# Patient Record
Sex: MALE | Race: WHITE | NOT HISPANIC OR LATINO | URBAN - METROPOLITAN AREA
[De-identification: names, ages, dates, MRNs, and addresses within clinical notes are randomized per-mention and may not be internally consistent; named-entity substitution may affect disease eponyms.]

---

## 2022-11-21 ENCOUNTER — OFFICE VISIT (OUTPATIENT)
Dept: URGENT CARE | Facility: CLINIC | Age: 22
End: 2022-11-21
Payer: MEDICAID

## 2022-11-21 VITALS
RESPIRATION RATE: 18 BRPM | BODY MASS INDEX: 28.81 KG/M2 | WEIGHT: 244 LBS | HEART RATE: 111 BPM | DIASTOLIC BLOOD PRESSURE: 77 MMHG | OXYGEN SATURATION: 97 % | TEMPERATURE: 99 F | SYSTOLIC BLOOD PRESSURE: 133 MMHG | HEIGHT: 77 IN

## 2022-11-21 DIAGNOSIS — J06.9 UPPER RESPIRATORY TRACT INFECTION, UNSPECIFIED TYPE: ICD-10-CM

## 2022-11-21 DIAGNOSIS — R05.9 COUGH, UNSPECIFIED TYPE: Primary | ICD-10-CM

## 2022-11-21 DIAGNOSIS — H66.93 ACUTE OTITIS MEDIA, BILATERAL: ICD-10-CM

## 2022-11-21 LAB
CTP QC/QA: YES
FLUAV AG NPH QL: NEGATIVE
FLUBV AG NPH QL: NEGATIVE
S PYO RRNA THROAT QL PROBE: NEGATIVE
SARS-COV-2 AG RESP QL IA.RAPID: NEGATIVE

## 2022-11-21 PROCEDURE — 99204 OFFICE O/P NEW MOD 45 MIN: CPT | Mod: 25,S$GLB,, | Performed by: STUDENT IN AN ORGANIZED HEALTH CARE EDUCATION/TRAINING PROGRAM

## 2022-11-21 PROCEDURE — 87804 INFLUENZA ASSAY W/OPTIC: CPT | Mod: QW,,, | Performed by: STUDENT IN AN ORGANIZED HEALTH CARE EDUCATION/TRAINING PROGRAM

## 2022-11-21 PROCEDURE — 1159F MED LIST DOCD IN RCRD: CPT | Mod: CPTII,S$GLB,, | Performed by: STUDENT IN AN ORGANIZED HEALTH CARE EDUCATION/TRAINING PROGRAM

## 2022-11-21 PROCEDURE — 3078F DIAST BP <80 MM HG: CPT | Mod: CPTII,S$GLB,, | Performed by: STUDENT IN AN ORGANIZED HEALTH CARE EDUCATION/TRAINING PROGRAM

## 2022-11-21 PROCEDURE — 3075F PR MOST RECENT SYSTOLIC BLOOD PRESS GE 130-139MM HG: ICD-10-PCS | Mod: CPTII,S$GLB,, | Performed by: STUDENT IN AN ORGANIZED HEALTH CARE EDUCATION/TRAINING PROGRAM

## 2022-11-21 PROCEDURE — 87811 SARS-COV-2 COVID19 W/OPTIC: CPT | Mod: QW,S$GLB,, | Performed by: STUDENT IN AN ORGANIZED HEALTH CARE EDUCATION/TRAINING PROGRAM

## 2022-11-21 PROCEDURE — 87880 POCT RAPID STREP A: ICD-10-PCS | Mod: QW,,, | Performed by: STUDENT IN AN ORGANIZED HEALTH CARE EDUCATION/TRAINING PROGRAM

## 2022-11-21 PROCEDURE — 1160F PR REVIEW ALL MEDS BY PRESCRIBER/CLIN PHARMACIST DOCUMENTED: ICD-10-PCS | Mod: CPTII,S$GLB,, | Performed by: STUDENT IN AN ORGANIZED HEALTH CARE EDUCATION/TRAINING PROGRAM

## 2022-11-21 PROCEDURE — 96372 THER/PROPH/DIAG INJ SC/IM: CPT | Mod: S$GLB,,, | Performed by: STUDENT IN AN ORGANIZED HEALTH CARE EDUCATION/TRAINING PROGRAM

## 2022-11-21 PROCEDURE — 99204 PR OFFICE/OUTPT VISIT, NEW, LEVL IV, 45-59 MIN: ICD-10-PCS | Mod: 25,S$GLB,, | Performed by: STUDENT IN AN ORGANIZED HEALTH CARE EDUCATION/TRAINING PROGRAM

## 2022-11-21 PROCEDURE — 3078F PR MOST RECENT DIASTOLIC BLOOD PRESSURE < 80 MM HG: ICD-10-PCS | Mod: CPTII,S$GLB,, | Performed by: STUDENT IN AN ORGANIZED HEALTH CARE EDUCATION/TRAINING PROGRAM

## 2022-11-21 PROCEDURE — 1160F RVW MEDS BY RX/DR IN RCRD: CPT | Mod: CPTII,S$GLB,, | Performed by: STUDENT IN AN ORGANIZED HEALTH CARE EDUCATION/TRAINING PROGRAM

## 2022-11-21 PROCEDURE — 87811 SARS CORONAVIRUS 2 ANTIGEN POCT, MANUAL READ: ICD-10-PCS | Mod: QW,S$GLB,, | Performed by: STUDENT IN AN ORGANIZED HEALTH CARE EDUCATION/TRAINING PROGRAM

## 2022-11-21 PROCEDURE — 96372 PR INJECTION,THERAP/PROPH/DIAG2ST, IM OR SUBCUT: ICD-10-PCS | Mod: S$GLB,,, | Performed by: STUDENT IN AN ORGANIZED HEALTH CARE EDUCATION/TRAINING PROGRAM

## 2022-11-21 PROCEDURE — 3008F PR BODY MASS INDEX (BMI) DOCUMENTED: ICD-10-PCS | Mod: CPTII,S$GLB,, | Performed by: STUDENT IN AN ORGANIZED HEALTH CARE EDUCATION/TRAINING PROGRAM

## 2022-11-21 PROCEDURE — 87804 POCT INFLUENZA A/B: ICD-10-PCS | Mod: 59,QW,, | Performed by: STUDENT IN AN ORGANIZED HEALTH CARE EDUCATION/TRAINING PROGRAM

## 2022-11-21 PROCEDURE — 87880 STREP A ASSAY W/OPTIC: CPT | Mod: QW,,, | Performed by: STUDENT IN AN ORGANIZED HEALTH CARE EDUCATION/TRAINING PROGRAM

## 2022-11-21 PROCEDURE — 3008F BODY MASS INDEX DOCD: CPT | Mod: CPTII,S$GLB,, | Performed by: STUDENT IN AN ORGANIZED HEALTH CARE EDUCATION/TRAINING PROGRAM

## 2022-11-21 PROCEDURE — 3075F SYST BP GE 130 - 139MM HG: CPT | Mod: CPTII,S$GLB,, | Performed by: STUDENT IN AN ORGANIZED HEALTH CARE EDUCATION/TRAINING PROGRAM

## 2022-11-21 PROCEDURE — 1159F PR MEDICATION LIST DOCUMENTED IN MEDICAL RECORD: ICD-10-PCS | Mod: CPTII,S$GLB,, | Performed by: STUDENT IN AN ORGANIZED HEALTH CARE EDUCATION/TRAINING PROGRAM

## 2022-11-21 RX ORDER — METHYLPHENIDATE HYDROCHLORIDE EXTENDED RELEASE 10 MG/1
10 TABLET ORAL EVERY MORNING
COMMUNITY
Start: 2022-11-03 | End: 2022-12-03

## 2022-11-21 RX ORDER — DEXAMETHASONE SODIUM PHOSPHATE 4 MG/ML
8 INJECTION, SOLUTION INTRA-ARTICULAR; INTRALESIONAL; INTRAMUSCULAR; INTRAVENOUS; SOFT TISSUE
Status: COMPLETED | OUTPATIENT
Start: 2022-11-21 | End: 2022-11-21

## 2022-11-21 RX ORDER — AMOXICILLIN AND CLAVULANATE POTASSIUM 875; 125 MG/1; MG/1
1 TABLET, FILM COATED ORAL EVERY 12 HOURS
Qty: 20 TABLET | Refills: 0 | Status: SHIPPED | OUTPATIENT
Start: 2022-11-21 | End: 2022-12-01

## 2022-11-21 RX ORDER — CETIRIZINE HYDROCHLORIDE 10 MG/1
10 TABLET ORAL
COMMUNITY
End: 2024-01-05

## 2022-11-21 RX ORDER — GUAIFENESIN/DEXTROMETHORPHAN 100-10MG/5
5 SYRUP ORAL EVERY 4 HOURS PRN
Qty: 118 ML | Refills: 0 | Status: SHIPPED | OUTPATIENT
Start: 2022-11-21 | End: 2022-12-01

## 2022-11-21 RX ORDER — LEVETIRACETAM 1000 MG/1
1500 TABLET ORAL 2 TIMES DAILY
COMMUNITY
Start: 2022-02-17

## 2022-11-21 RX ADMIN — DEXAMETHASONE SODIUM PHOSPHATE 8 MG: 4 INJECTION, SOLUTION INTRA-ARTICULAR; INTRALESIONAL; INTRAMUSCULAR; INTRAVENOUS; SOFT TISSUE at 01:11

## 2022-11-21 NOTE — PROGRESS NOTES
"   Subjective:       Patient ID: Lukas Ovalle is a 22 y.o. male.    Vitals:  height is 6' 5" (1.956 m) and weight is 110.7 kg (244 lb). His temperature is 99.2 °F (37.3 °C). His blood pressure is 133/77 and his pulse is 111 (abnormal). His respiration is 18 and oxygen saturation is 97%.     Chief Complaint: Sore Throat    Patient is a 22-year-old male who presents to clinic for evaluation of sore throat.  Patient reports symptoms x4 days.  Patient reports positive sick exposure as his sister has strep throat.  Patient reports over-the-counter medications with no relief of symptoms.  Patient reports he is vaccinated for COVID however not influenza.  Patient states that he has experienced fatigue, bilateral ear pain, nasal sinus congestion with postnasal drainage, sore throat with painful swallowing, nonproductive cough, and headaches.  Patient denies any acute dizziness, generalized body aches, ear drainage, tinnitus or hearing loss, drooling or voice change, chest pain or palpitations, shortness of breath, abdominal pain, nausea or vomiting, diarrhea, rash, or change in mentation.    Sore Throat   This is a new problem. The current episode started in the past 7 days (x's 4 days). The problem has been gradually worsening. There has been no fever. Associated symptoms include congestion, coughing, ear pain (Bilateral), headaches and trouble swallowing. Pertinent negatives include no abdominal pain, diarrhea, drooling, ear discharge, shortness of breath or vomiting. Associated symptoms comments: Sore throat. Treatments tried: dayquil, nyquil. The treatment provided mild relief.     Constitution: Positive for fatigue. Negative for chills, sweating and fever.   HENT:  Positive for ear pain (Bilateral), congestion, postnasal drip, sore throat and trouble swallowing. Negative for ear discharge, tinnitus, hearing loss, drooling and voice change.    Neck: neck negative.   Cardiovascular: Negative.  Negative for chest pain " and palpitations.   Eyes: Negative.    Respiratory:  Positive for cough. Negative for chest tightness, sputum production and shortness of breath.    Gastrointestinal: Negative.  Negative for abdominal pain, nausea, vomiting and diarrhea.   Endocrine: negative.   Genitourinary: Negative.    Musculoskeletal: Negative.  Negative for muscle ache.   Skin: Negative.  Negative for color change, pale, rash and erythema.   Allergic/Immunologic: Negative.    Neurological:  Positive for headaches. Negative for dizziness, light-headedness, passing out, disorientation and altered mental status.   Hematologic/Lymphatic: Negative.    Psychiatric/Behavioral: Negative.  Negative for altered mental status, disorientation and confusion.      Objective:      Physical Exam   Constitutional: He is oriented to person, place, and time. He appears well-developed. He is cooperative.  Non-toxic appearance. He appears ill. No distress.   HENT:   Head: Normocephalic and atraumatic.   Ears:   Right Ear: Hearing, external ear and ear canal normal. Tympanic membrane is erythematous and bulging.   Left Ear: Hearing, external ear and ear canal normal. Tympanic membrane is erythematous and bulging.   Nose: Congestion present. No mucosal edema, rhinorrhea or nasal deformity. No epistaxis. Right sinus exhibits no maxillary sinus tenderness and no frontal sinus tenderness. Left sinus exhibits no maxillary sinus tenderness and no frontal sinus tenderness.   Mouth/Throat: Uvula is midline and mucous membranes are normal. Mucous membranes are moist. No trismus in the jaw. Normal dentition. No uvula swelling. Posterior oropharyngeal erythema and cobblestoning present. No oropharyngeal exudate. Oropharynx is clear.   Eyes: Conjunctivae and lids are normal. Pupils are equal, round, and reactive to light. Right eye exhibits no discharge. Left eye exhibits no discharge. No scleral icterus.   Neck: Trachea normal and phonation normal. Neck supple. No neck  rigidity present.   Cardiovascular: Regular rhythm, normal heart sounds and normal pulses. Tachycardia present.   Pulmonary/Chest: Effort normal and breath sounds normal. No respiratory distress. He has no wheezes. He has no rhonchi. He has no rales.   Abdominal: Normal appearance and bowel sounds are normal. He exhibits no distension. Soft. There is no abdominal tenderness.   Musculoskeletal: Normal range of motion.         General: No deformity. Normal range of motion.      Cervical back: He exhibits no tenderness.   Lymphadenopathy:     He has no cervical adenopathy.   Neurological: He is alert and oriented to person, place, and time. He exhibits normal muscle tone. Coordination normal.   Skin: Skin is warm, dry, intact, not diaphoretic, not pale and no rash. Capillary refill takes less than 2 seconds. No erythema   Psychiatric: His speech is normal and behavior is normal. Judgment and thought content normal.   Nursing note and vitals reviewed.      Assessment:       1. Cough, unspecified type    2. Acute otitis media, bilateral    3. Upper respiratory tract infection, unspecified type          Plan:         Cough, unspecified type  -     SARS Coronavirus 2 Antigen, POCT Manual Read  -     POCT Influenza A/B  -     POCT rapid strep A    Acute otitis media, bilateral    Upper respiratory tract infection, unspecified type    Other orders  -     amoxicillin-clavulanate 875-125mg (AUGMENTIN) 875-125 mg per tablet; Take 1 tablet by mouth every 12 (twelve) hours. for 10 days  Dispense: 20 tablet; Refill: 0  -     dexAMETHasone injection 8 mg  -     dextromethorphan-guaiFENesin  mg/5 ml (ROBITUSSIN-DM)  mg/5 mL liquid; Take 5 mLs by mouth every 4 (four) hours as needed (Cough).  Dispense: 118 mL; Refill: 0               Labs:  COVID negative.  Rapid strep negative.  Influenza A and B negative.    Decadron 8 mg IM in clinic.  Patient tolerated well.  No complications noted.    Take medications as prescribed.     Recommend warm salt water gargles every 2-3 hours as needed for sore throat.  Recommend throat lozenges as needed for sore throat.    Recommend over-the-counter Flonase and antihistamine of choice such as Zyrtec as directed.  Tylenol/Motrin per package instructions for any pain or fever.    Assure adequate hydration.    Follow-up with PCP in 1-2 days.    Return to clinic as needed.    To ED for any new acutely worsening symptoms.    Patient in agreement plan of care.    DISCLAIMER: Please note that my documentation in this Electronic Healthcare Record was produced using speech recognition software and therefore may contain errors related to that software system.These could include grammar, punctuation and spelling errors or the inclusion/exclusion of phrases that were not intended. Garbled syntax, mangled pronouns, and other bizarre constructions may be attributed to that software system.

## 2023-02-22 ENCOUNTER — OFFICE VISIT (OUTPATIENT)
Dept: URGENT CARE | Facility: CLINIC | Age: 23
End: 2023-02-22
Payer: MEDICAID

## 2023-02-22 VITALS
RESPIRATION RATE: 16 BRPM | SYSTOLIC BLOOD PRESSURE: 126 MMHG | BODY MASS INDEX: 28.93 KG/M2 | WEIGHT: 244 LBS | HEART RATE: 76 BPM | DIASTOLIC BLOOD PRESSURE: 78 MMHG | TEMPERATURE: 99 F | OXYGEN SATURATION: 98 %

## 2023-02-22 DIAGNOSIS — J02.9 SORE THROAT: Primary | ICD-10-CM

## 2023-02-22 DIAGNOSIS — H66.91 ACUTE OTITIS MEDIA, RIGHT: ICD-10-CM

## 2023-02-22 DIAGNOSIS — J01.40 ACUTE NON-RECURRENT PANSINUSITIS: ICD-10-CM

## 2023-02-22 DIAGNOSIS — R09.81 NASAL SINUS CONGESTION: ICD-10-CM

## 2023-02-22 PROCEDURE — 1159F PR MEDICATION LIST DOCUMENTED IN MEDICAL RECORD: ICD-10-PCS | Mod: CPTII,S$GLB,, | Performed by: STUDENT IN AN ORGANIZED HEALTH CARE EDUCATION/TRAINING PROGRAM

## 2023-02-22 PROCEDURE — 3008F BODY MASS INDEX DOCD: CPT | Mod: CPTII,S$GLB,, | Performed by: STUDENT IN AN ORGANIZED HEALTH CARE EDUCATION/TRAINING PROGRAM

## 2023-02-22 PROCEDURE — 87811 SARS-COV-2 COVID19 W/OPTIC: CPT | Mod: QW,S$GLB,, | Performed by: STUDENT IN AN ORGANIZED HEALTH CARE EDUCATION/TRAINING PROGRAM

## 2023-02-22 PROCEDURE — 87804 INFLUENZA ASSAY W/OPTIC: CPT | Mod: QW,,, | Performed by: STUDENT IN AN ORGANIZED HEALTH CARE EDUCATION/TRAINING PROGRAM

## 2023-02-22 PROCEDURE — 1159F MED LIST DOCD IN RCRD: CPT | Mod: CPTII,S$GLB,, | Performed by: STUDENT IN AN ORGANIZED HEALTH CARE EDUCATION/TRAINING PROGRAM

## 2023-02-22 PROCEDURE — 3078F DIAST BP <80 MM HG: CPT | Mod: CPTII,S$GLB,, | Performed by: STUDENT IN AN ORGANIZED HEALTH CARE EDUCATION/TRAINING PROGRAM

## 2023-02-22 PROCEDURE — 1160F PR REVIEW ALL MEDS BY PRESCRIBER/CLIN PHARMACIST DOCUMENTED: ICD-10-PCS | Mod: CPTII,S$GLB,, | Performed by: STUDENT IN AN ORGANIZED HEALTH CARE EDUCATION/TRAINING PROGRAM

## 2023-02-22 PROCEDURE — 87811 SARS CORONAVIRUS 2 ANTIGEN POCT, MANUAL READ: ICD-10-PCS | Mod: QW,S$GLB,, | Performed by: STUDENT IN AN ORGANIZED HEALTH CARE EDUCATION/TRAINING PROGRAM

## 2023-02-22 PROCEDURE — 3078F PR MOST RECENT DIASTOLIC BLOOD PRESSURE < 80 MM HG: ICD-10-PCS | Mod: CPTII,S$GLB,, | Performed by: STUDENT IN AN ORGANIZED HEALTH CARE EDUCATION/TRAINING PROGRAM

## 2023-02-22 PROCEDURE — 99214 PR OFFICE/OUTPT VISIT, EST, LEVL IV, 30-39 MIN: ICD-10-PCS | Mod: 25,S$GLB,, | Performed by: STUDENT IN AN ORGANIZED HEALTH CARE EDUCATION/TRAINING PROGRAM

## 2023-02-22 PROCEDURE — 96372 PR INJECTION,THERAP/PROPH/DIAG2ST, IM OR SUBCUT: ICD-10-PCS | Mod: S$GLB,,, | Performed by: EMERGENCY MEDICINE

## 2023-02-22 PROCEDURE — 3008F PR BODY MASS INDEX (BMI) DOCUMENTED: ICD-10-PCS | Mod: CPTII,S$GLB,, | Performed by: STUDENT IN AN ORGANIZED HEALTH CARE EDUCATION/TRAINING PROGRAM

## 2023-02-22 PROCEDURE — 3074F SYST BP LT 130 MM HG: CPT | Mod: CPTII,S$GLB,, | Performed by: STUDENT IN AN ORGANIZED HEALTH CARE EDUCATION/TRAINING PROGRAM

## 2023-02-22 PROCEDURE — 87804 POCT INFLUENZA A/B: ICD-10-PCS | Mod: QW,,, | Performed by: STUDENT IN AN ORGANIZED HEALTH CARE EDUCATION/TRAINING PROGRAM

## 2023-02-22 PROCEDURE — 3074F PR MOST RECENT SYSTOLIC BLOOD PRESSURE < 130 MM HG: ICD-10-PCS | Mod: CPTII,S$GLB,, | Performed by: STUDENT IN AN ORGANIZED HEALTH CARE EDUCATION/TRAINING PROGRAM

## 2023-02-22 PROCEDURE — 1160F RVW MEDS BY RX/DR IN RCRD: CPT | Mod: CPTII,S$GLB,, | Performed by: STUDENT IN AN ORGANIZED HEALTH CARE EDUCATION/TRAINING PROGRAM

## 2023-02-22 PROCEDURE — 87880 POCT RAPID STREP A: ICD-10-PCS | Mod: QW,,, | Performed by: STUDENT IN AN ORGANIZED HEALTH CARE EDUCATION/TRAINING PROGRAM

## 2023-02-22 PROCEDURE — 99214 OFFICE O/P EST MOD 30 MIN: CPT | Mod: 25,S$GLB,, | Performed by: STUDENT IN AN ORGANIZED HEALTH CARE EDUCATION/TRAINING PROGRAM

## 2023-02-22 PROCEDURE — 96372 THER/PROPH/DIAG INJ SC/IM: CPT | Mod: S$GLB,,, | Performed by: EMERGENCY MEDICINE

## 2023-02-22 PROCEDURE — 87880 STREP A ASSAY W/OPTIC: CPT | Mod: QW,,, | Performed by: STUDENT IN AN ORGANIZED HEALTH CARE EDUCATION/TRAINING PROGRAM

## 2023-02-22 RX ORDER — FLUTICASONE PROPIONATE 50 MCG
1 SPRAY, SUSPENSION (ML) NASAL DAILY
Qty: 15.8 ML | Refills: 0 | Status: SHIPPED | OUTPATIENT
Start: 2023-02-22 | End: 2024-01-05

## 2023-02-22 RX ORDER — BENZONATATE 200 MG/1
200 CAPSULE ORAL 3 TIMES DAILY PRN
Qty: 30 CAPSULE | Refills: 0 | Status: SHIPPED | OUTPATIENT
Start: 2023-02-22 | End: 2023-03-04

## 2023-02-22 RX ORDER — AMOXICILLIN AND CLAVULANATE POTASSIUM 875; 125 MG/1; MG/1
1 TABLET, FILM COATED ORAL EVERY 12 HOURS
Qty: 20 TABLET | Refills: 0 | Status: SHIPPED | OUTPATIENT
Start: 2023-02-22 | End: 2023-03-04

## 2023-02-22 RX ORDER — DEXAMETHASONE SODIUM PHOSPHATE 4 MG/ML
8 INJECTION, SOLUTION INTRA-ARTICULAR; INTRALESIONAL; INTRAMUSCULAR; INTRAVENOUS; SOFT TISSUE
Status: COMPLETED | OUTPATIENT
Start: 2023-02-22 | End: 2023-02-22

## 2023-02-22 RX ADMIN — DEXAMETHASONE SODIUM PHOSPHATE 8 MG: 4 INJECTION, SOLUTION INTRA-ARTICULAR; INTRALESIONAL; INTRAMUSCULAR; INTRAVENOUS; SOFT TISSUE at 01:02

## 2023-02-22 NOTE — PROGRESS NOTES
Subjective:       Patient ID: Lukas Ovalle is a 22 y.o. male.    Vitals:  weight is 110.7 kg (244 lb). His oral temperature is 98.7 °F (37.1 °C). His blood pressure is 126/78 and his pulse is 76. His respiration is 16 and oxygen saturation is 98%.     Chief Complaint: Cough    Patient is a 22-year-old male with a past medical history of ADHD, anxiety, depression, and seizures who presents to clinic for evaluation of cough and congestion.  Patient reports symptoms for approximately 2 weeks.  Patient states over-the-counter medications with no relief of symptoms.  Patient reports he is vaccinated.  Patient denies any recent or known sick exposures.    Cough  This is a new problem. The current episode started 1 to 4 weeks ago. The problem has been unchanged. The problem occurs constantly. The cough is Productive of sputum. Associated symptoms include ear congestion, ear pain, headaches, nasal congestion, postnasal drip and a sore throat. Pertinent negatives include no chest pain, chills, fever, myalgias, rash, shortness of breath or wheezing. His past medical history is significant for asthma.     Constitution: Positive for fatigue. Negative for chills, sweating and fever.   HENT:  Positive for ear pain, congestion, nosebleeds (Reports 1 episode yesterday), postnasal drip, sinus pressure, sore throat and trouble swallowing (Painful swallowing). Negative for ear discharge, tinnitus, hearing loss, drooling and voice change.    Neck: neck negative.   Cardiovascular: Negative.  Negative for chest pain and palpitations.   Eyes: Negative.    Respiratory:  Positive for cough and sputum production. Negative for chest tightness, shortness of breath and wheezing.    Gastrointestinal: Negative.  Negative for abdominal pain, nausea, vomiting and diarrhea.   Endocrine: negative.   Genitourinary: Negative.    Musculoskeletal: Negative.  Negative for muscle ache.   Skin: Negative.  Negative for color change, pale, rash and  erythema.   Allergic/Immunologic: Negative.    Neurological:  Positive for headaches. Negative for dizziness, light-headedness, passing out, disorientation and altered mental status.   Hematologic/Lymphatic: Negative.    Psychiatric/Behavioral: Negative.  Negative for altered mental status, disorientation and confusion.      Objective:      Physical Exam   Constitutional: He is oriented to person, place, and time. He appears well-developed. He is cooperative.  Non-toxic appearance. He appears ill. No distress.   HENT:   Head: Normocephalic and atraumatic.   Ears:   Right Ear: Hearing, external ear and ear canal normal. No no drainage. Tympanic membrane is erythematous and bulging. No decreased hearing is noted.   Left Ear: Hearing, tympanic membrane, external ear and ear canal normal.   Nose: Mucosal edema and congestion present. No rhinorrhea or nasal deformity. No epistaxis. Right sinus exhibits maxillary sinus tenderness and frontal sinus tenderness. Left sinus exhibits maxillary sinus tenderness and frontal sinus tenderness.   Mouth/Throat: Uvula is midline and mucous membranes are normal. Mucous membranes are moist. No trismus in the jaw. Normal dentition. No uvula swelling. Posterior oropharyngeal erythema present. No oropharyngeal exudate. Oropharynx is clear.   Eyes: Conjunctivae and lids are normal. Pupils are equal, round, and reactive to light. Right eye exhibits no discharge. Left eye exhibits no discharge. No scleral icterus.   Neck: Trachea normal and phonation normal. Neck supple. No neck rigidity present.   Cardiovascular: Normal rate, regular rhythm, normal heart sounds and normal pulses.   Pulmonary/Chest: Effort normal and breath sounds normal. No respiratory distress. He has no wheezes. He has no rhonchi. He has no rales.   Abdominal: Normal appearance and bowel sounds are normal. He exhibits no distension. Soft. There is no abdominal tenderness.   Musculoskeletal: Normal range of motion.          General: Normal range of motion.      Cervical back: He exhibits no tenderness.   Lymphadenopathy:     He has no cervical adenopathy.   Neurological: He is alert and oriented to person, place, and time. He exhibits normal muscle tone.   Skin: Skin is warm, dry, intact, not diaphoretic and no rash. Capillary refill takes less than 2 seconds. No erythema   Psychiatric: His speech is normal and behavior is normal. Judgment and thought content normal.   Nursing note and vitals reviewed.      Assessment:       1. Sore throat    2. Nasal sinus congestion    3. Acute non-recurrent pansinusitis    4. Acute otitis media, right          Plan:         Sore throat  -     POCT rapid strep A  -     POCT Influenza A/B Rapid Antigen  -     SARS Coronavirus 2 Antigen, POCT Manual Read    Nasal sinus congestion  -     POCT Influenza A/B Rapid Antigen  -     SARS Coronavirus 2 Antigen, POCT Manual Read    Acute non-recurrent pansinusitis    Acute otitis media, right    Other orders  -     dexAMETHasone injection 8 mg  -     amoxicillin-clavulanate 875-125mg (AUGMENTIN) 875-125 mg per tablet; Take 1 tablet by mouth every 12 (twelve) hours. for 10 days  Dispense: 20 tablet; Refill: 0  -     fluticasone propionate (FLONASE) 50 mcg/actuation nasal spray; 1 spray (50 mcg total) by Each Nostril route once daily.  Dispense: 15.8 mL; Refill: 0  -     benzonatate (TESSALON) 200 MG capsule; Take 1 capsule (200 mg total) by mouth 3 (three) times daily as needed for Cough.  Dispense: 30 capsule; Refill: 0                 Labs:  COVID negative.  Rapid strep negative.  Influenza A and B negative.  Decadron 8 mg IM in clinic.  Patient tolerated well.  No complications noted.    Take medications as prescribed.    Recommend continued use of Zyrtec daily as directed.    Tylenol/Motrin per package instructions for any pain or fever.    Assure adequate hydration.    Recommend nasal saline flushes or irrigation as directed.    Follow-up with PCP in 1-2  days.    Return to clinic as needed.    To ED for any new or acutely worsening symptoms.    Patient in agreement plan of care.    DISCLAIMER: Please note that my documentation in this Electronic Healthcare Record was produced using speech recognition software and therefore may contain errors related to that software system.These could include grammar, punctuation and spelling errors or the inclusion/exclusion of phrases that were not intended. Garbled syntax, mangled pronouns, and other bizarre constructions may be attributed to that software system.

## 2023-09-21 ENCOUNTER — OFFICE VISIT (OUTPATIENT)
Dept: URGENT CARE | Facility: CLINIC | Age: 23
End: 2023-09-21
Payer: MEDICAID

## 2023-09-21 VITALS
DIASTOLIC BLOOD PRESSURE: 74 MMHG | TEMPERATURE: 98 F | OXYGEN SATURATION: 99 % | WEIGHT: 244 LBS | HEART RATE: 75 BPM | BODY MASS INDEX: 28.93 KG/M2 | SYSTOLIC BLOOD PRESSURE: 112 MMHG | RESPIRATION RATE: 16 BRPM

## 2023-09-21 DIAGNOSIS — J02.9 SORE THROAT: Primary | ICD-10-CM

## 2023-09-21 DIAGNOSIS — R05.9 COUGH, UNSPECIFIED TYPE: ICD-10-CM

## 2023-09-21 DIAGNOSIS — J02.0 STREP PHARYNGITIS: ICD-10-CM

## 2023-09-21 DIAGNOSIS — R11.0 NAUSEA: ICD-10-CM

## 2023-09-21 LAB
CTP QC/QA: YES
S PYO RRNA THROAT QL PROBE: POSITIVE

## 2023-09-21 PROCEDURE — 87880 STREP A ASSAY W/OPTIC: CPT | Mod: QW,,, | Performed by: STUDENT IN AN ORGANIZED HEALTH CARE EDUCATION/TRAINING PROGRAM

## 2023-09-21 PROCEDURE — 99214 PR OFFICE/OUTPT VISIT, EST, LEVL IV, 30-39 MIN: ICD-10-PCS | Mod: S$GLB,,, | Performed by: STUDENT IN AN ORGANIZED HEALTH CARE EDUCATION/TRAINING PROGRAM

## 2023-09-21 PROCEDURE — 87880 POCT RAPID STREP A: ICD-10-PCS | Mod: QW,,, | Performed by: STUDENT IN AN ORGANIZED HEALTH CARE EDUCATION/TRAINING PROGRAM

## 2023-09-21 PROCEDURE — 99214 OFFICE O/P EST MOD 30 MIN: CPT | Mod: S$GLB,,, | Performed by: STUDENT IN AN ORGANIZED HEALTH CARE EDUCATION/TRAINING PROGRAM

## 2023-09-21 RX ORDER — CHLOPHEDIANOL HCL AND PYRILAMINE MALEATE 12.5; 12.5 MG/5ML; MG/5ML
5 SOLUTION ORAL
Qty: 118 ML | Refills: 0 | Status: SHIPPED | OUTPATIENT
Start: 2023-09-21 | End: 2024-01-05

## 2023-09-21 RX ORDER — ONDANSETRON 4 MG/1
4 TABLET, ORALLY DISINTEGRATING ORAL EVERY 6 HOURS PRN
Qty: 15 TABLET | Refills: 0 | Status: SHIPPED | OUTPATIENT
Start: 2023-09-21 | End: 2024-01-05

## 2023-09-21 RX ORDER — LACOSAMIDE 100 MG/1
100 TABLET ORAL 2 TIMES DAILY
COMMUNITY
Start: 2023-08-02

## 2023-09-21 RX ORDER — AMOXICILLIN 875 MG/1
875 TABLET, FILM COATED ORAL EVERY 12 HOURS
Qty: 20 TABLET | Refills: 0 | Status: SHIPPED | OUTPATIENT
Start: 2023-09-21 | End: 2023-10-01

## 2023-09-21 RX ORDER — BENZOCAINE/MENTHOL 15 MG-10MG
1 LOZENGE MUCOUS MEMBRANE
Qty: 30 LOZENGE | Refills: 0 | Status: SHIPPED | OUTPATIENT
Start: 2023-09-21 | End: 2024-01-05

## 2023-09-21 NOTE — PROGRESS NOTES
Subjective:      Patient ID: Lukas Ovalle is a 22 y.o. male.    Vitals:  weight is 110.7 kg (244 lb). His oral temperature is 98.3 °F (36.8 °C). His blood pressure is 112/74 and his pulse is 75. His respiration is 16 and oxygen saturation is 99%.     Chief Complaint: Sinus Problem    Patient is a 22-year-old male with a past medical history of ADHD, anxiety, depression, and seizures who presents to clinic for evaluation of sore throat and sinus related symptoms.  Patient reports he is vaccinated.  Patient reports positive sick exposure to multiple cases of strep throat.  Patient states over-the-counter medications with no significant relief to symptoms.  Patient reports symptoms for about 1 week now however worse over the past 3 days.    Sinus Problem  This is a new problem. The current episode started in the past 7 days. There has been no fever. Associated symptoms include chills, congestion, coughing, headaches and a sore throat. Pertinent negatives include no ear pain or shortness of breath.       Constitution: Positive for chills and fatigue.   HENT:  Positive for congestion, sore throat and trouble swallowing (Painful swallowing). Negative for ear pain.    Neck: neck negative.   Cardiovascular: Negative.  Negative for chest pain and palpitations.   Eyes: Negative.    Respiratory:  Positive for cough and sputum production. Negative for chest tightness and shortness of breath.    Gastrointestinal: Negative.  Negative for abdominal pain, nausea, vomiting and diarrhea.   Endocrine: negative.   Genitourinary: Negative.    Musculoskeletal: Negative.  Negative for muscle ache.   Skin: Negative.  Negative for color change, pale, rash and erythema.   Allergic/Immunologic: Negative.    Neurological:  Positive for headaches. Negative for dizziness, light-headedness, passing out, disorientation and altered mental status.   Hematologic/Lymphatic: Negative.    Psychiatric/Behavioral: Negative.  Negative for altered  mental status, disorientation and confusion.       Objective:     Physical Exam   Constitutional: He is oriented to person, place, and time. He appears well-developed. He is cooperative.  Non-toxic appearance. He does not appear ill. No distress.   HENT:   Head: Normocephalic and atraumatic.   Ears:   Right Ear: Hearing, tympanic membrane, external ear and ear canal normal.   Left Ear: Hearing, tympanic membrane, external ear and ear canal normal.   Nose: Congestion present. No mucosal edema, rhinorrhea or nasal deformity. No epistaxis. Right sinus exhibits no maxillary sinus tenderness and no frontal sinus tenderness. Left sinus exhibits no maxillary sinus tenderness and no frontal sinus tenderness.   Mouth/Throat: Uvula is midline and mucous membranes are normal. Mucous membranes are moist. No trismus in the jaw. Normal dentition. No uvula swelling. Oropharyngeal exudate and posterior oropharyngeal erythema present.   Eyes: Conjunctivae and lids are normal. Pupils are equal, round, and reactive to light. Right eye exhibits no discharge. Left eye exhibits no discharge. No scleral icterus.   Neck: Trachea normal and phonation normal. Neck supple. No neck rigidity present.   Cardiovascular: Normal rate, regular rhythm, normal heart sounds and normal pulses.   Pulmonary/Chest: Effort normal and breath sounds normal. No respiratory distress. He has no wheezes. He has no rhonchi. He has no rales.   Abdominal: Normal appearance and bowel sounds are normal. He exhibits no distension. Soft. There is no abdominal tenderness.   Musculoskeletal: Normal range of motion.         General: Normal range of motion.      Cervical back: He exhibits no tenderness.   Lymphadenopathy:     He has cervical adenopathy.   Neurological: He is alert and oriented to person, place, and time. He exhibits normal muscle tone.   Skin: Skin is warm, dry, intact, not diaphoretic, not pale and no rash. Capillary refill takes less than 2 seconds. No  erythema   Psychiatric: His speech is normal and behavior is normal. Judgment and thought content normal.   Nursing note and vitals reviewed.      Assessment:     1. Sore throat    2. Nausea    3. Cough, unspecified type    4. Strep pharyngitis        Plan:       Sore throat  -     POCT rapid strep A    Nausea  -     POCT rapid strep A    Cough, unspecified type  -     POCT rapid strep A    Strep pharyngitis    Other orders  -     amoxicillin (AMOXIL) 875 MG tablet; Take 1 tablet (875 mg total) by mouth every 12 (twelve) hours. for 10 days  Dispense: 20 tablet; Refill: 0  -     ondansetron (ZOFRAN-ODT) 4 MG TbDL; Take 1 tablet (4 mg total) by mouth every 6 (six) hours as needed (Nausea).  Dispense: 15 tablet; Refill: 0  -     pyrilamine-chlophedianoL (NINJACOF) 12.5-12.5 mg/5 mL Liqd; Take 5 mLs by mouth every 6 to 8 hours as needed (Cough).  Dispense: 118 mL; Refill: 0  -     benzocaine-menthoL (CHLORASEPTIC MAX) 15-10 mg Lozg; 1 lozenge by Mucous Membrane route every 2 (two) hours as needed (Sore throat).  Dispense: 30 lozenge; Refill: 0                Labs:  Rapid strep positive.  Take medications as prescribed.  Tylenol/Motrin per package instructions for any pain or fever.  Recommend warm salt water gargles every 2-3 hours while awake.  Recommend replacing toothbrush and washing linens in hot water 48 hours after starting antibiotics.  Follow-up with PCP in 1-2 days.  Follow-up ENT as needed.  Return to clinic as needed.  To ED for any new or acutely worsening symptoms.  Patient in agreement with plan of care.     DISCLAIMER: Please note that my documentation in this Electronic Healthcare Record was produced using speech recognition software and therefore may contain errors related to that software system.These could include grammar, punctuation and spelling errors or the inclusion/exclusion of phrases that were not intended. Garbled syntax, mangled pronouns, and other bizarre constructions may be attributed to  that software system.

## 2023-09-21 NOTE — PATIENT INSTRUCTIONS
Labs:  Rapid strep positive.  Take medications as prescribed.  Tylenol/Motrin per package instructions for any pain or fever.  Recommend warm salt water gargles every 2-3 hours while awake.  Recommend replacing toothbrush and washing linens in hot water 48 hours after starting antibiotics.

## 2024-01-05 ENCOUNTER — OFFICE VISIT (OUTPATIENT)
Dept: URGENT CARE | Facility: CLINIC | Age: 24
End: 2024-01-05
Payer: MEDICAID

## 2024-01-05 VITALS
OXYGEN SATURATION: 99 % | HEIGHT: 78 IN | WEIGHT: 240 LBS | SYSTOLIC BLOOD PRESSURE: 124 MMHG | TEMPERATURE: 99 F | DIASTOLIC BLOOD PRESSURE: 74 MMHG | HEART RATE: 96 BPM | RESPIRATION RATE: 18 BRPM | BODY MASS INDEX: 27.77 KG/M2

## 2024-01-05 DIAGNOSIS — J06.9 VIRAL URI WITH COUGH: Primary | ICD-10-CM

## 2024-01-05 DIAGNOSIS — Z20.822 COVID-19 VIRUS NOT DETECTED: ICD-10-CM

## 2024-01-05 PROCEDURE — 87811 SARS-COV-2 COVID19 W/OPTIC: CPT | Mod: QW,S$GLB,, | Performed by: NURSE PRACTITIONER

## 2024-01-05 PROCEDURE — 99214 OFFICE O/P EST MOD 30 MIN: CPT | Mod: S$GLB,,, | Performed by: NURSE PRACTITIONER

## 2024-01-05 PROCEDURE — 87880 STREP A ASSAY W/OPTIC: CPT | Mod: QW,,, | Performed by: NURSE PRACTITIONER

## 2024-01-05 PROCEDURE — 87804 INFLUENZA ASSAY W/OPTIC: CPT | Mod: 59,QW,, | Performed by: NURSE PRACTITIONER

## 2024-01-05 RX ORDER — AZELASTINE 1 MG/ML
1 SPRAY, METERED NASAL 2 TIMES DAILY PRN
Qty: 30 ML | Refills: 0 | Status: SHIPPED | OUTPATIENT
Start: 2024-01-05 | End: 2025-01-04

## 2024-01-05 RX ORDER — BROMPHENIRAMINE MALEATE, PSEUDOEPHEDRINE HYDROCHLORIDE, AND DEXTROMETHORPHAN HYDROBROMIDE 2; 30; 10 MG/5ML; MG/5ML; MG/5ML
10 SYRUP ORAL EVERY 4 HOURS PRN
Qty: 118 ML | Refills: 0 | Status: SHIPPED | OUTPATIENT
Start: 2024-01-05 | End: 2024-01-15

## 2024-01-05 RX ORDER — GUAIFENESIN 200 MG/1
400 TABLET ORAL EVERY 4 HOURS PRN
Qty: 30 TABLET | Refills: 0 | Status: SHIPPED | OUTPATIENT
Start: 2024-01-05 | End: 2024-01-12

## 2024-01-05 RX ORDER — PROMETHAZINE HYDROCHLORIDE AND DEXTROMETHORPHAN HYDROBROMIDE 6.25; 15 MG/5ML; MG/5ML
5 SYRUP ORAL NIGHTLY PRN
Qty: 50 ML | Refills: 0 | Status: SHIPPED | OUTPATIENT
Start: 2024-01-05 | End: 2024-01-12

## 2024-01-05 NOTE — PROGRESS NOTES
"Subjective:      Patient ID: Lukas Ovalle is a 23 y.o. male.    Vitals:  height is 6' 6" (1.981 m) and weight is 108.9 kg (240 lb). His oral temperature is 98.5 °F (36.9 °C). His blood pressure is 124/74 and his pulse is 96. His respiration is 18 and oxygen saturation is 99%.     Chief Complaint: URI    URI   This is a new problem. The current episode started in the past 7 days. The problem has been gradually worsening. There has been no fever. Associated symptoms include congestion, coughing, sinus pain and a sore throat. Pertinent negatives include no chest pain, ear pain, nausea, rash or vomiting.       Constitution: Negative for chills and fever.   HENT:  Positive for congestion, sinus pain and sore throat. Negative for ear pain.    Neck: Negative for painful lymph nodes.   Cardiovascular:  Negative for chest pain, palpitations and sob on exertion.   Respiratory:  Positive for cough and sputum production. Negative for shortness of breath.    Gastrointestinal:  Negative for nausea and vomiting.   Musculoskeletal:  Negative for muscle ache.   Skin:  Negative for rash.   Neurological:  Negative for dizziness, light-headedness, passing out, disorientation and altered mental status.   Hematologic/Lymphatic: Negative for swollen lymph nodes.   Psychiatric/Behavioral:  Negative for altered mental status, disorientation and confusion.       Objective:     Physical Exam   Constitutional: He is oriented to person, place, and time. He appears well-developed. He is cooperative.  Non-toxic appearance. He does not appear ill. No distress.   HENT:   Head: Normocephalic and atraumatic.   Ears:   Right Ear: Hearing and external ear normal.   Left Ear: Hearing and external ear normal.   Nose: Mucosal edema, rhinorrhea and congestion present. No nasal deformity. No epistaxis. Right sinus exhibits no maxillary sinus tenderness and no frontal sinus tenderness. Left sinus exhibits no maxillary sinus tenderness and no frontal " sinus tenderness.   Mouth/Throat: Uvula is midline and mucous membranes are normal. Mucous membranes are moist. No trismus in the jaw. Normal dentition. No uvula swelling. Posterior oropharyngeal erythema present. No oropharyngeal exudate, posterior oropharyngeal edema, tonsillar abscesses or cobblestoning. Tonsils are 1+ on the right. Tonsils are 1+ on the left. No tonsillar exudate.   Eyes: Conjunctivae and lids are normal. No scleral icterus.   Neck: Trachea normal and phonation normal. Neck supple. No edema present. No erythema present. No neck rigidity present.   Cardiovascular: Normal rate, regular rhythm, normal heart sounds and normal pulses.   Pulmonary/Chest: Effort normal and breath sounds normal. No stridor. No respiratory distress. He has no decreased breath sounds. He has no rhonchi.   Abdominal: Normal appearance.   Musculoskeletal: Normal range of motion.         General: No deformity. Normal range of motion.   Lymphadenopathy:     He has no cervical adenopathy.   Neurological: no focal deficit. He is alert and oriented to person, place, and time. He exhibits normal muscle tone. Coordination normal.   Skin: Skin is warm, dry, intact, not diaphoretic and not pale.   Psychiatric: His speech is normal and behavior is normal. Judgment and thought content normal.   Nursing note and vitals reviewed.      Assessment:     1. Viral URI with cough    2. COVID-19 virus not detected        Plan:       Viral URI with cough  -     SARS Coronavirus 2 Antigen, POCT Manual Read  -     POCT rapid strep A  -     POCT Influenza A/B Rapid Antigen  -     azelastine (ASTELIN) 137 mcg (0.1 %) nasal spray; 1 spray (137 mcg total) by Nasal route 2 (two) times daily as needed for Rhinitis.  Dispense: 30 mL; Refill: 0  -     guaiFENesin 200 mg tablet; Take 2 tablets (400 mg total) by mouth every 4 (four) hours as needed for Congestion.  Dispense: 30 tablet; Refill: 0  -     benzocaine-menthoL 6-10 mg lozenge; Take 1 lozenge by  mouth every 2 (two) hours as needed (Sore Throat).  Dispense: 18 tablet; Refill: 0  -     brompheniramine-pseudoeph-DM (BROMFED DM) 2-30-10 mg/5 mL Syrp; Take 10 mLs by mouth every 4 (four) hours as needed (sinus congestion/drainage).  Dispense: 118 mL; Refill: 0  -     promethazine-dextromethorphan (PROMETHAZINE-DM) 6.25-15 mg/5 mL Syrp; Take 5 mLs by mouth nightly as needed (cough).  Dispense: 50 mL; Refill: 0    COVID-19 virus not detected      Flu negative  Strep negative    I have discussed the test results and physical exam findings with the patient. We discussed symptom monitoring, conservative care methods, medication use, and follow up orders. The patient verbalized understanding and agreement with the plan of care.